# Patient Record
(demographics unavailable — no encounter records)

---

## 2021-02-13 NOTE — NUR
PT BIBSELF C/O OF MIDEPIGASTRIC PAIN. PT AAOX4, BUT IS UNDER THE INFLUENCE. PT 
STATES HIS LAST DRINK WAS "7 HOURS AGO". PT BREATHING EVENLY AND UNLABORED. 
SKIN IS WARM, DRY, AND INTACT. RT AC 20G IV INITIATED. BLOOD DRAWN AND SENT TO 
LAB. PT DENIES N/V. PT CHANGED INTO GOWN AND ATTACHED TO THE MONITOR AND POX. 
PT GIVEN A BLANKET AND CALL LIGHT WITHIN REACH.